# Patient Record
Sex: FEMALE | Race: WHITE | NOT HISPANIC OR LATINO | Employment: OTHER | URBAN - METROPOLITAN AREA
[De-identification: names, ages, dates, MRNs, and addresses within clinical notes are randomized per-mention and may not be internally consistent; named-entity substitution may affect disease eponyms.]

---

## 2017-01-04 ENCOUNTER — HOSPITAL ENCOUNTER (OUTPATIENT)
Dept: RADIOLOGY | Facility: MEDICAL CENTER | Age: 67
End: 2017-01-04
Attending: INTERNAL MEDICINE
Payer: COMMERCIAL

## 2017-01-04 DIAGNOSIS — I70.90 ATHEROSCLEROSIS OF ARTERIES: ICD-10-CM

## 2017-01-04 PROCEDURE — 4410556 CT-CARDIAC SCORING

## 2017-01-27 ENCOUNTER — OFFICE VISIT (OUTPATIENT)
Dept: MEDICAL GROUP | Facility: CLINIC | Age: 67
End: 2017-01-27
Payer: COMMERCIAL

## 2017-01-27 VITALS
RESPIRATION RATE: 18 BRPM | HEART RATE: 70 BPM | TEMPERATURE: 96.5 F | HEIGHT: 62 IN | OXYGEN SATURATION: 97 % | WEIGHT: 150 LBS | BODY MASS INDEX: 27.6 KG/M2 | DIASTOLIC BLOOD PRESSURE: 88 MMHG | SYSTOLIC BLOOD PRESSURE: 138 MMHG

## 2017-01-27 DIAGNOSIS — I10 ESSENTIAL HYPERTENSION: ICD-10-CM

## 2017-01-27 DIAGNOSIS — M54.42 LEFT-SIDED LOW BACK PAIN WITH LEFT-SIDED SCIATICA, UNSPECIFIED CHRONICITY: ICD-10-CM

## 2017-01-27 DIAGNOSIS — R23.4 SKIN TEXTURE CHANGES: ICD-10-CM

## 2017-01-27 DIAGNOSIS — Z13.820 SCREENING FOR OSTEOPOROSIS: ICD-10-CM

## 2017-01-27 PROCEDURE — 99214 OFFICE O/P EST MOD 30 MIN: CPT | Performed by: INTERNAL MEDICINE

## 2017-01-27 NOTE — MR AVS SNAPSHOT
"Delfina Gold   2017 9:00 AM   Office Visit   MRN: [id number]    Department:  [facility]   Dept Phone:  [phone]    Description:  Female : 1950   Provider:  Tacho Burciaga D.O.           Reason for Visit     Follow-Up           Allergies as of 2017     Allergen Noted Reactions    Clindamycin 2011       Patient with c. Difficile colitis due to this medication 7/3/2011      You were diagnosed with     Essential hypertension   [8203664]       Screening for osteoporosis   [175146]       Left-sided low back pain with left-sided sciatica, unspecified chronicity   [0483499]       Skin texture changes   [729544]         Vital Signs     Blood Pressure Pulse Temperature Respirations Height Weight    138/88 mmHg 70 35.8 °C (96.5 °F) 18 1.575 m (5' 2\") 68.04 kg (150 lb)    Body Mass Index Oxygen Saturation Smoking Status             27.43 kg/m2 97% Never Smoker          Basic Information     Date Of Birth Sex Race Ethnicity Preferred Language    1950 Female White Non- English      Problem List              ICD-10-CM Priority Class Noted - Resolved    Dyslipidemia E78.5   2010 - Present    Cellulitis L03.90   Unknown - Present    Hypertension I10   2013 - Present    TIA (transient ischemic attack) G45.9   2014 - Present    Right-sided low back pain with right-sided sciatica M54.41   2015 - Present    Left-sided low back pain with sciatica M54.42   2017 - Present      Health Maintenance        Date Due Completion Dates    IMM ZOSTER VACCINE 2011 ---    IMM INFLUENZA (1) 2016, 2014    BONE DENSITY 2016    MAMMOGRAM 2017, 10/26/2015, 10/20/2015, 2014, 2013, 2012, 2012, 2011, 2010, 2010, 2008, 2008, 2007, 2007, 2006, 2006, 2005    COLONOSCOPY 11/3/2020 11/3/2010 (Done)    Override on 11/3/2010: Done    IMM DTaP/Tdap/Td Vaccine " (2 - Td) 1/4/2023 1/4/2013            Current Immunizations     13-VALENT PCV PREVNAR 1/21/2014    Influenza TIV (IM) 9/29/2014, 1/21/2014    Pneumococcal polysaccharide vaccine (PPSV-23) 12/22/2016 10:27 AM    Tdap Vaccine 1/4/2013      Below and/or attached are the medications your provider expects you to take. Review all of your home medications and newly ordered medications with your provider and/or pharmacist. Follow medication instructions as directed by your provider and/or pharmacist. Please keep your medication list with you and share with your provider. Update the information when medications are discontinued, doses are changed, or new medications (including over-the-counter products) are added; and carry medication information at all times in the event of emergency situations     Allergies:  CLINDAMYCIN - (reactions not documented)               Medications  Valid as of: January 27, 2017 - 10:02 AM    Generic Name Brand Name Tablet Size Instructions for use    Aspirin (Tablet Delayed Response) ECOTRIN 81 MG Take 1 Tab by mouth every day.        Cholecalciferol (Tab) cholecalciferol 1000 UNIT Take 2,000 Units by mouth every day.        Estradiol (PATCH WEEKLY) CLIMARA 0.025 MG/24HR APPLY ONE PATCH TO THE SKIN AS DIRECTED AND REPLACE EVERY SATURDAY        Estradiol (PATCH WEEKLY) CLIMARA 0.025 MG/24HR Apply 1 Patch to skin as directed every 7 days.        Fiber   Take 1 Each by mouth every day.        Ibuprofen (Tab) MOTRIN 800 MG Take 800 mg by mouth every 8 hours as needed.        Lactobacillus Rhamnosus (GG) (Cap) CULTURELLE  Take 1 Cap by mouth every day.        Losartan Potassium (Tab) COZAAR 50 MG Take 1 Tab by mouth every day.        MethylPREDNISolone (Kit) MEDROL DOSEPACK 4 MG Per package insert.        NON SPECIFIED   Medrol dose pack take as directed.        Omeprazole (CAPSULE DELAYED RELEASE) PRILOSEC 20 MG Take 1 Cap by mouth 2 times a day.        TraMADol HCl (Tab) ULTRAM 50 MG Take 1 Tab by  mouth every four hours as needed for Mild Pain.        .                 Medicines prescribed today were sent to:     [id number] - [place], [place] - [address] AT [facility] [address]    [address] [place] [address]    Phone: [phone] Fax: [phone]    Open 24 Hours?: No      Medication refill instructions:       If your prescription bottle indicates you have medication refills left, it is not necessary to call your provider’s office. Please contact your pharmacy and they will refill your medication.    If your prescription bottle indicates you do not have any refills left, you may request refills at any time through one of the following ways: The online [employer] system (except Urgent Care), by calling your provider’s office, or by asking your pharmacy to contact your provider’s office with a refill request. Medication refills are processed only during regular business hours and may not be available until the next business day. Your provider may request additional information or to have a follow-up visit with you prior to refilling your medication.   *Please Note: Medication refills are assigned a new Rx number when refilled electronically. Your pharmacy may indicate that no refills were authorized even though a new prescription for the same medication is available at the pharmacy. Please request the medicine by name with the pharmacy before contacting your provider for a refill.        Your To Do List     Future Labs/Procedures Complete By Expires    DS-BONE DENSITY STUDY (DEXA)  As directed 7/30/2017      Referral     A referral request has been sent to our patient care coordination department. Please allow 3-5 business days for us to process this request and contact you either by phone or mail. If you do not hear from us by the 5th business day, please call us at [phone].           [employer] Access Code: Activation code not generated  Current [employer] Status:  Active

## 2017-01-27 NOTE — PROGRESS NOTES
CC: Delfina Gold is a 66 y.o. female is suffering from   Chief Complaint   Patient presents with   • Follow-Up         SUBJECTIVE:  1. Essential hypertension  Delfina is here for follow-up, has brought in blood pressure records that show significant improvement in her blood pressure. Patient is feeling well except for some dryness associated with use of losartan.     2. Screening for osteoporosis  Patient is in need of screening for osteoporosis orders written.     3. Left-sided low back pain with left-sided sciatica, unspecified chronicity  Patient with ongoing low back pain intermittently radiating the left hand side.     4. Skin texture changes  Patient was skin changes requesting evaluation by dermatology        Past social, family, history:   Social History   Substance Use Topics   • Smoking status: Never Smoker    • Smokeless tobacco: Never Used   • Alcohol Use: No         MEDICATIONS:    Current outpatient prescriptions:   •  ibuprofen (MOTRIN) 800 MG Tab, Take 800 mg by mouth every 8 hours as needed., Disp: , Rfl:   •  tramadol (ULTRAM) 50 MG Tab, Take 1 Tab by mouth every four hours as needed for Mild Pain., Disp: 90 Tab, Rfl: 3  •  losartan (COZAAR) 50 MG Tab, Take 1 Tab by mouth every day., Disp: 90 Tab, Rfl: 3  •  estradiol (CLIMARA) 0.025 MG/24HR PATCH WEEKLY, Apply 1 Patch to skin as directed every 7 days., Disp: , Rfl:   •  NON SPECIFIED, Medrol dose pack take as directed., Disp: 1 Each, Rfl: 0  •  estradiol (CLIMARA) 0.025 MG/24HR PATCH WEEKLY, APPLY ONE PATCH TO THE SKIN AS DIRECTED AND REPLACE EVERY SATURDAY, Disp: 12 Patch, Rfl: 3  •  methylPREDNISolone (MEDROL DOSEPACK) 4 MG tablet, Per package insert. (Patient not taking: Reported on 12/22/2016), Disp: 1 Kit, Rfl: 0  •  omeprazole (PRILOSEC) 20 MG delayed-release capsule, Take 1 Cap by mouth 2 times a day. (Patient not taking: Reported on 12/22/2016), Disp: 60 Cap, Rfl: 3  •  aspirin EC (ECOTRIN) 81 MG TBEC, Take 1 Tab by mouth every  "day. (Patient not taking: Reported on 12/22/2016), Disp: 30 Tab, Rfl: 0  •  vitamin D (CHOLECALCIFEROL) 1000 UNIT TABS, Take 2,000 Units by mouth every day., Disp: , Rfl:   •  lactobacillus rhamnosus, GG, (CULTURELLE) CAPS, Take 1 Cap by mouth every day., Disp: , Rfl:   •  FIBER SELECT GUMMIES PO, Take 1 Each by mouth every day., Disp: , Rfl:     PROBLEMS:  Patient Active Problem List    Diagnosis Date Noted   • Left-sided low back pain with sciatica 01/27/2017   • Right-sided low back pain with right-sided sciatica 09/24/2015   • TIA (transient ischemic attack) 01/21/2014   • Hypertension 01/04/2013   • Cellulitis    • Dyslipidemia 09/24/2010       REVIEW OF SYSTEMS:  Gen.:  No Nausea, Vomiting, fever, Chills.  Heart: No chest pain.  Lungs:  No shortness of Breath.  Psychological: Trip unusual Anxiety depression     PHYSICAL EXAM   Constitutional: Alert, cooperative, not in acute distress.  Cardiovascular:  Rate Rhythm is regular without murmurs rubs clicks.     Thorax & Lungs: Clear to auscultation, no wheezing, rhonchi, or rales  HENT: Normocephalic, Atraumatic.  Eyes: PERRLA, EOMI, Conjunctiva normal.   Neck: Trachia is midline no swelling of the thyroid.   Extremities: Atraumatic with symmetric distal pulses, No edema, No tenderness, No cyanosis, No clubbing.   Musculoskeletal: Patient will retain for flexion and extension side bending and rotation clinically stable regarding her back pain.   Neurologic: Alert & oriented x 3, cranial nerves II through XII are intact, Normal motor function, Normal sensory function, No focal deficits noted.   Psychiatric: Affect normal, Judgment normal, Mood normal.     VITAL SIGNS:/88 mmHg  Pulse 70  Temp(Src) 35.8 °C (96.5 °F)  Resp 18  Ht 1.575 m (5' 2\")  Wt 68.04 kg (150 lb)  BMI 27.43 kg/m2  SpO2 97%    Labs: Reviewed    Assessment:                                                     Plan:    1. Essential hypertension  Improved blood pressure off of " nonsteroidal anti-inflammatories.     2. Screening for osteoporosis  DEXA scan ordered  - DS-BONE DENSITY STUDY (DEXA); Future    3. Left-sided low back pain with left-sided sciatica, unspecified chronicity  No change in medical therapy clinically stable at this point    4. Skin texture changes  Referral written to dermatology  - REFERRAL TO DERMATOLOGY

## 2017-02-06 ENCOUNTER — HOSPITAL ENCOUNTER (OUTPATIENT)
Dept: RADIOLOGY | Facility: MEDICAL CENTER | Age: 67
End: 2017-02-06
Attending: INTERNAL MEDICINE
Payer: COMMERCIAL

## 2017-02-06 DIAGNOSIS — M85.80 OSTEOPENIA: ICD-10-CM

## 2017-02-06 PROCEDURE — 77080 DXA BONE DENSITY AXIAL: CPT

## 2017-04-19 ENCOUNTER — OFFICE VISIT (OUTPATIENT)
Dept: MEDICAL GROUP | Facility: CLINIC | Age: 67
End: 2017-04-19
Payer: COMMERCIAL

## 2017-04-19 VITALS
HEART RATE: 82 BPM | WEIGHT: 148.3 LBS | OXYGEN SATURATION: 94 % | BODY MASS INDEX: 26.28 KG/M2 | DIASTOLIC BLOOD PRESSURE: 82 MMHG | HEIGHT: 63 IN | RESPIRATION RATE: 18 BRPM | SYSTOLIC BLOOD PRESSURE: 126 MMHG | TEMPERATURE: 98.6 F

## 2017-04-19 DIAGNOSIS — M65.351 TRIGGER LITTLE FINGER OF RIGHT HAND: ICD-10-CM

## 2017-04-19 DIAGNOSIS — I10 ESSENTIAL HYPERTENSION: ICD-10-CM

## 2017-04-19 DIAGNOSIS — E78.5 DYSLIPIDEMIA: ICD-10-CM

## 2017-04-19 PROCEDURE — 99213 OFFICE O/P EST LOW 20 MIN: CPT | Performed by: INTERNAL MEDICINE

## 2017-04-19 RX ORDER — LOSARTAN POTASSIUM 25 MG/1
25 TABLET ORAL DAILY
Qty: 90 TAB | Refills: 3 | Status: SHIPPED | OUTPATIENT
Start: 2017-04-19 | End: 2018-01-17

## 2017-04-19 NOTE — PROGRESS NOTES
CC: Delfina Gold is a 66 y.o. female is suffering from   Chief Complaint   Patient presents with   • Follow-Up         SUBJECTIVE:  1. Essential hypertension  Patient with records which show good control at home.     2. Trigger little finger of right hand  After working in the yard in february.         Past social, family, history: .   Social History   Substance Use Topics   • Smoking status: Never Smoker    • Smokeless tobacco: Never Used   • Alcohol Use: 4.2 - 8.4 oz/week     7-14 Cans of beer per week      Comment: 1 to 2 beers a day         MEDICATIONS:    Current outpatient prescriptions:   •  losartan (COZAAR) 25 MG Tab, Take 1 Tab by mouth every day., Disp: 90 Tab, Rfl: 3  •  estradiol (CLIMARA) 0.025 MG/24HR PATCH WEEKLY, Apply 1 Patch to skin as directed every 7 days., Disp: , Rfl:   •  ibuprofen (MOTRIN) 800 MG Tab, Take 800 mg by mouth every 8 hours as needed., Disp: , Rfl:   •  tramadol (ULTRAM) 50 MG Tab, Take 1 Tab by mouth every four hours as needed for Mild Pain., Disp: 90 Tab, Rfl: 3  •  NON SPECIFIED, Medrol dose pack take as directed., Disp: 1 Each, Rfl: 0  •  estradiol (CLIMARA) 0.025 MG/24HR PATCH WEEKLY, APPLY ONE PATCH TO THE SKIN AS DIRECTED AND REPLACE EVERY SATURDAY, Disp: 12 Patch, Rfl: 3  •  methylPREDNISolone (MEDROL DOSEPACK) 4 MG tablet, Per package insert. (Patient not taking: Reported on 12/22/2016), Disp: 1 Kit, Rfl: 0  •  omeprazole (PRILOSEC) 20 MG delayed-release capsule, Take 1 Cap by mouth 2 times a day. (Patient not taking: Reported on 12/22/2016), Disp: 60 Cap, Rfl: 3  •  aspirin EC (ECOTRIN) 81 MG TBEC, Take 1 Tab by mouth every day. (Patient not taking: Reported on 12/22/2016), Disp: 30 Tab, Rfl: 0  •  vitamin D (CHOLECALCIFEROL) 1000 UNIT TABS, Take 2,000 Units by mouth every day., Disp: , Rfl:   •  lactobacillus rhamnosus, GG, (CULTURELLE) CAPS, Take 1 Cap by mouth every day., Disp: , Rfl:   •  FIBER SELECT GUMMIES PO, Take 1 Each by mouth every day., Disp:  ", Rfl:     PROBLEMS:  Patient Active Problem List    Diagnosis Date Noted   • Left-sided low back pain with sciatica 01/27/2017   • Right-sided low back pain with right-sided sciatica 09/24/2015   • TIA (transient ischemic attack) 01/21/2014   • Hypertension 01/04/2013   • Cellulitis    • Dyslipidemia 09/24/2010       REVIEW OF SYSTEMS:  Gen.:  No Nausea, Vomiting, fever, Chills.  Heart: No chest pain.  Lungs:  No shortness of Breath.  Psychological: Trip unusual Anxiety depression     PHYSICAL EXAM   Constitutional: Alert, cooperative, not in acute distress.  Cardiovascular:  Rate Rhythm is regular without murmurs rubs clicks.     Thorax & Lungs: Clear to auscultation, no wheezing, rhonchi, or rales  HENT: Normocephalic, Atraumatic.  Eyes: PERRLA, EOMI, Conjunctiva normal.   Neck: Trachia is midline no swelling of the thyroid.   Musculoskeletal:  Right hand little finger with trigger finger.   Neurologic: Alert & oriented x 3, cranial nerves II through XII are intact, Normal motor function, Normal sensory function, No focal deficits noted.   Psychiatric: Affect normal, Judgment normal, Mood normal.     VITAL SIGNS:/82 mmHg  Pulse 82  Temp(Src) 37 °C (98.6 °F)  Resp 18  Ht 1.6 m (5' 3\")  Wt 67.268 kg (148 lb 4.8 oz)  BMI 26.28 kg/m2  SpO2 94%  Breastfeeding? No    Labs: Reviewed    Assessment:                                                     Plan:    1. Essential hypertension  Continue with Cozaar.   - losartan (COZAAR) 25 MG Tab; Take 1 Tab by mouth every day.  Dispense: 90 Tab; Refill: 3    2. Trigger little finger of right hand  Stable   - REFERRAL TO ORTHOPEDICS          "

## 2017-04-19 NOTE — MR AVS SNAPSHOT
"Delfina Jaylinmax Gold   2017 2:20 PM   Office Visit   MRN: [id number]    Department:  [facility]   Dept Phone:  [phone]    Description:  Female : 1950   Provider:  Tacho Burciaga D.O.           Reason for Visit     Follow-Up           Allergies as of 2017     Allergen Noted Reactions    Clindamycin 2011       Patient with c. Difficile colitis due to this medication 7/3/2011      You were diagnosed with     Essential hypertension   [5813699]       Trigger little finger of right hand   [699824]       Dyslipidemia   [182141]         Vital Signs     Blood Pressure Pulse Temperature Respirations Height Weight    126/82 mmHg 82 37 °C (98.6 °F) 18 1.6 m (5' 3\") 67.268 kg (148 lb 4.8 oz)    Body Mass Index Oxygen Saturation Breastfeeding? Smoking Status          26.28 kg/m2 94% No Never Smoker         Basic Information     Date Of Birth Sex Race Ethnicity Preferred Language    1950 Female White Non- English      Problem List              ICD-10-CM Priority Class Noted - Resolved    Dyslipidemia E78.5   2010 - Present    Cellulitis L03.90   Unknown - Present    Hypertension I10   2013 - Present    TIA (transient ischemic attack) G45.9   2014 - Present    Right-sided low back pain with right-sided sciatica M54.41   2015 - Present    Left-sided low back pain with sciatica M54.42   2017 - Present      Health Maintenance        Date Due Completion Dates    IMM ZOSTER VACCINE 2011 ---    MAMMOGRAM 2017, 10/26/2015, 10/20/2015, 2014, 2013, 2012, 2012, 2011, 2010, 2010, 2008, 2008, 2007, 2007, 2006, 2006, 2005    COLONOSCOPY 11/3/2020 11/3/2010 (Done)    Override on 11/3/2010: Done    BONE DENSITY 2022, 2005    IMM DTaP/Tdap/Td Vaccine (2 - Td) 2023            Current Immunizations     13-VALENT PCV PREVNAR 2014    Influenza TIV " (IM) 9/29/2014, 1/21/2014    Pneumococcal polysaccharide vaccine (PPSV-23) 12/22/2016 10:27 AM    Tdap Vaccine 1/4/2013      Below and/or attached are the medications your provider expects you to take. Review all of your home medications and newly ordered medications with your provider and/or pharmacist. Follow medication instructions as directed by your provider and/or pharmacist. Please keep your medication list with you and share with your provider. Update the information when medications are discontinued, doses are changed, or new medications (including over-the-counter products) are added; and carry medication information at all times in the event of emergency situations     Allergies:  CLINDAMYCIN - (reactions not documented)               Medications  Valid as of: April 19, 2017 -  4:53 PM    Generic Name Brand Name Tablet Size Instructions for use    Aspirin (Tablet Delayed Response) ECOTRIN 81 MG Take 1 Tab by mouth every day.        Cholecalciferol (Tab) cholecalciferol 1000 UNIT Take 2,000 Units by mouth every day.        Estradiol (PATCH WEEKLY) CLIMARA 0.025 MG/24HR APPLY ONE PATCH TO THE SKIN AS DIRECTED AND REPLACE EVERY SATURDAY        Estradiol (PATCH WEEKLY) CLIMARA 0.025 MG/24HR Apply 1 Patch to skin as directed every 7 days.        Fiber   Take 1 Each by mouth every day.        Ibuprofen (Tab) MOTRIN 800 MG Take 800 mg by mouth every 8 hours as needed.        Lactobacillus Rhamnosus (GG) (Cap) CULTURELLE  Take 1 Cap by mouth every day.        Losartan Potassium (Tab) COZAAR 25 MG Take 1 Tab by mouth every day.        MethylPREDNISolone (Kit) MEDROL DOSEPACK 4 MG Per package insert.        NON SPECIFIED   Medrol dose pack take as directed.        Omeprazole (CAPSULE DELAYED RELEASE) PRILOSEC 20 MG Take 1 Cap by mouth 2 times a day.        TraMADol HCl (Tab) ULTRAM 50 MG Take 1 Tab by mouth every four hours as needed for Mild Pain.        .                 Medicines prescribed today were sent to:       [id number] - [place], [place] - [address] AT [facility] [address]    [address] [place] [address]    Phone: [phone] Fax: [phone]    Open 24 Hours?: No      Medication refill instructions:       If your prescription bottle indicates you have medication refills left, it is not necessary to call your provider’s office. Please contact your pharmacy and they will refill your medication.    If your prescription bottle indicates you do not have any refills left, you may request refills at any time through one of the following ways: The online [employer] system (except Urgent Care), by calling your provider’s office, or by asking your pharmacy to contact your provider’s office with a refill request. Medication refills are processed only during regular business hours and may not be available until the next business day. Your provider may request additional information or to have a follow-up visit with you prior to refilling your medication.   *Please Note: Medication refills are assigned a new Rx number when refilled electronically. Your pharmacy may indicate that no refills were authorized even though a new prescription for the same medication is available at the pharmacy. Please request the medicine by name with the pharmacy before contacting your provider for a refill.        Your To Do List     Future Labs/Procedures Complete By Expires    CBC WITH DIFFERENTIAL  As directed 4/20/2018    COMP METABOLIC PANEL  As directed 4/20/2018    LIPID PROFILE  As directed 4/20/2018      Referral     A referral request has been sent to our patient care coordination department. Please allow 3-5 business days for us to process this request and contact you either by phone or mail. If you do not hear from us by the 5th business day, please call us at [phone].           [employer] Access Code: Activation code not generated  Current [employer] Status: Active

## 2017-07-26 ENCOUNTER — RX ONLY (OUTPATIENT)
Age: 67
Setting detail: RX ONLY
End: 2017-07-26

## 2017-12-19 ENCOUNTER — HOSPITAL ENCOUNTER (OUTPATIENT)
Dept: LAB | Facility: MEDICAL CENTER | Age: 67
End: 2017-12-19
Attending: INTERNAL MEDICINE
Payer: COMMERCIAL

## 2017-12-19 DIAGNOSIS — E78.5 DYSLIPIDEMIA: ICD-10-CM

## 2017-12-19 LAB
ALBUMIN SERPL BCP-MCNC: 4.2 G/DL (ref 3.2–4.9)
ALBUMIN/GLOB SERPL: 1.8 G/DL
ALP SERPL-CCNC: 43 U/L (ref 30–99)
ALT SERPL-CCNC: 15 U/L (ref 2–50)
ANION GAP SERPL CALC-SCNC: 6 MMOL/L (ref 0–11.9)
AST SERPL-CCNC: 19 U/L (ref 12–45)
BASOPHILS # BLD AUTO: 1.6 % (ref 0–1.8)
BASOPHILS # BLD: 0.09 K/UL (ref 0–0.12)
BILIRUB SERPL-MCNC: 0.6 MG/DL (ref 0.1–1.5)
BUN SERPL-MCNC: 13 MG/DL (ref 8–22)
CALCIUM SERPL-MCNC: 9.3 MG/DL (ref 8.5–10.5)
CHLORIDE SERPL-SCNC: 104 MMOL/L (ref 96–112)
CHOLEST SERPL-MCNC: 212 MG/DL (ref 100–199)
CO2 SERPL-SCNC: 29 MMOL/L (ref 20–33)
CREAT SERPL-MCNC: 0.58 MG/DL (ref 0.5–1.4)
EOSINOPHIL # BLD AUTO: 0.09 K/UL (ref 0–0.51)
EOSINOPHIL NFR BLD: 1.6 % (ref 0–6.9)
ERYTHROCYTE [DISTWIDTH] IN BLOOD BY AUTOMATED COUNT: 42.9 FL (ref 35.9–50)
GFR SERPL CREATININE-BSD FRML MDRD: >60 ML/MIN/1.73 M 2
GLOBULIN SER CALC-MCNC: 2.4 G/DL (ref 1.9–3.5)
GLUCOSE SERPL-MCNC: 93 MG/DL (ref 65–99)
HCT VFR BLD AUTO: 45.3 % (ref 37–47)
HDLC SERPL-MCNC: 70 MG/DL
HGB BLD-MCNC: 15.1 G/DL (ref 12–16)
IMM GRANULOCYTES # BLD AUTO: 0.02 K/UL (ref 0–0.11)
IMM GRANULOCYTES NFR BLD AUTO: 0.4 % (ref 0–0.9)
LDLC SERPL CALC-MCNC: 122 MG/DL
LYMPHOCYTES # BLD AUTO: 1.8 K/UL (ref 1–4.8)
LYMPHOCYTES NFR BLD: 32.2 % (ref 22–41)
MCH RBC QN AUTO: 30.9 PG (ref 27–33)
MCHC RBC AUTO-ENTMCNC: 33.3 G/DL (ref 33.6–35)
MCV RBC AUTO: 92.6 FL (ref 81.4–97.8)
MONOCYTES # BLD AUTO: 0.57 K/UL (ref 0–0.85)
MONOCYTES NFR BLD AUTO: 10.2 % (ref 0–13.4)
NEUTROPHILS # BLD AUTO: 3.02 K/UL (ref 2–7.15)
NEUTROPHILS NFR BLD: 54 % (ref 44–72)
NRBC # BLD AUTO: 0 K/UL
NRBC BLD-RTO: 0 /100 WBC
PLATELET # BLD AUTO: 306 K/UL (ref 164–446)
PMV BLD AUTO: 11.7 FL (ref 9–12.9)
POTASSIUM SERPL-SCNC: 4.7 MMOL/L (ref 3.6–5.5)
PROT SERPL-MCNC: 6.6 G/DL (ref 6–8.2)
RBC # BLD AUTO: 4.89 M/UL (ref 4.2–5.4)
SODIUM SERPL-SCNC: 139 MMOL/L (ref 135–145)
TRIGL SERPL-MCNC: 99 MG/DL (ref 0–149)
WBC # BLD AUTO: 5.6 K/UL (ref 4.8–10.8)

## 2017-12-19 PROCEDURE — 80061 LIPID PANEL: CPT

## 2017-12-19 PROCEDURE — 36415 COLL VENOUS BLD VENIPUNCTURE: CPT

## 2017-12-19 PROCEDURE — 85025 COMPLETE CBC W/AUTO DIFF WBC: CPT

## 2017-12-19 PROCEDURE — 80053 COMPREHEN METABOLIC PANEL: CPT

## 2017-12-27 ENCOUNTER — HOSPITAL ENCOUNTER (OUTPATIENT)
Dept: RADIOLOGY | Facility: MEDICAL CENTER | Age: 67
End: 2017-12-27
Attending: INTERNAL MEDICINE
Payer: COMMERCIAL

## 2017-12-27 DIAGNOSIS — Z12.31 SCREENING MAMMOGRAM, ENCOUNTER FOR: ICD-10-CM

## 2017-12-27 PROCEDURE — G0202 SCR MAMMO BI INCL CAD: HCPCS

## 2018-01-17 ENCOUNTER — OFFICE VISIT (OUTPATIENT)
Dept: MEDICAL GROUP | Facility: CLINIC | Age: 68
End: 2018-01-17
Payer: COMMERCIAL

## 2018-01-17 VITALS
SYSTOLIC BLOOD PRESSURE: 132 MMHG | HEART RATE: 84 BPM | RESPIRATION RATE: 16 BRPM | OXYGEN SATURATION: 96 % | BODY MASS INDEX: 26.9 KG/M2 | WEIGHT: 151.8 LBS | DIASTOLIC BLOOD PRESSURE: 76 MMHG | TEMPERATURE: 97.9 F | HEIGHT: 63 IN

## 2018-01-17 DIAGNOSIS — I10 ESSENTIAL HYPERTENSION: ICD-10-CM

## 2018-01-17 DIAGNOSIS — E78.5 DYSLIPIDEMIA: ICD-10-CM

## 2018-01-17 PROCEDURE — 99213 OFFICE O/P EST LOW 20 MIN: CPT | Performed by: INTERNAL MEDICINE

## 2018-01-17 RX ORDER — LOSARTAN POTASSIUM 50 MG/1
50 TABLET ORAL DAILY
Qty: 30 TAB | Refills: 6 | Status: SHIPPED | OUTPATIENT
Start: 2018-01-17 | End: 2018-01-17

## 2018-01-17 RX ORDER — HYDROCHLOROTHIAZIDE 12.5 MG/1
12.5 TABLET ORAL DAILY
Qty: 30 TAB | Refills: 6 | Status: SHIPPED | OUTPATIENT
Start: 2018-01-17 | End: 2018-09-01 | Stop reason: SDUPTHER

## 2018-01-17 ASSESSMENT — PATIENT HEALTH QUESTIONNAIRE - PHQ9: CLINICAL INTERPRETATION OF PHQ2 SCORE: 0

## 2018-01-17 NOTE — PROGRESS NOTES
CC: Delfina Gold is a 67 y.o. female is suffering from No chief complaint on file.        SUBJECTIVE:  1. Essential hypertension  Patient's here for follow-up has essential hypertension, her home blood pressure records show typically blood pressures in the 140s in the afternoons, recommend that we are a little more aggressive with his    2. Dyslipidemia  Patient with mild dyslipidemia, patient with an excellent HDL total cholesterol ratio approximately 3-1 recommend no action        Past social, family, history: ,  retire in 1-1/2 years moving back to [facility]  Social History   Substance Use Topics   • Smoking status: Never Smoker   • Smokeless tobacco: Never Used   • Alcohol use 4.2 - 8.4 oz/week     7 - 14 Cans of beer per week      Comment: 1 to 2 beers a day         MEDICATIONS:    Current Outpatient Prescriptions:   •  hydrochlorothiazide (HYDRODIURIL) 12.5 MG tablet, Take 1 Tab by mouth every day., Disp: 30 Tab, Rfl: 6  •  lactobacillus rhamnosus, GG, (CULTURELLE) CAPS, Take 1 Cap by mouth every day., Disp: , Rfl:   •  FIBER SELECT GUMMIES PO, Take 1 Each by mouth every day., Disp: , Rfl:   •  ibuprofen (MOTRIN) 800 MG Tab, Take 800 mg by mouth every 8 hours as needed., Disp: , Rfl:   •  vitamin D (CHOLECALCIFEROL) 1000 UNIT TABS, Take 2,000 Units by mouth every day., Disp: , Rfl:     PROBLEMS:  Patient Active Problem List    Diagnosis Date Noted   • Left-sided low back pain with sciatica 01/27/2017   • Right-sided low back pain with right-sided sciatica 09/24/2015   • TIA (transient ischemic attack) 01/21/2014   • Hypertension 01/04/2013   • Cellulitis    • Dyslipidemia 09/24/2010       REVIEW OF SYSTEMS:  Gen.:  No Nausea, Vomiting, fever, Chills.  Heart: No chest pain.  Lungs:  No shortness of Breath.  Psychological: Trip unusual Anxiety depression     PHYSICAL EXAM   Constitutional: Alert, cooperative, not in acute distress.  Cardiovascular:  Rate Rhythm is regular without  "murmurs rubs clicks.     Thorax & Lungs: Clear to auscultation, no wheezing, rhonchi, or rales  HENT: Normocephalic, Atraumatic.  Eyes: PERRLA, EOMI, Conjunctiva normal.   Neck: Trachia is midline no swelling of the thyroid.   Lymphatic: No lymphadenopathy noted.   Neurologic: Alert & oriented x 3, cranial nerves II through XII are intact, Normal motor function, Normal sensory function, No focal deficits noted.   Psychiatric: Affect normal, Judgment normal, Mood normal.     VITAL SIGNS:/76   Pulse 84   Temp 36.6 °C (97.9 °F)   Resp 16   Ht 1.6 m (5' 3\")   Wt 68.9 kg (151 lb 12.8 oz)   SpO2 96%   Breastfeeding? No   BMI 26.89 kg/m²     Labs: Reviewed    Assessment:                                                     Plan:    1. Essential hypertension  Add hydrochlorothiazide to patient's Cozaar  - hydrochlorothiazide (HYDRODIURIL) 12.5 MG tablet; Take 1 Tab by mouth every day.  Dispense: 30 Tab; Refill: 6  - BASIC METABOLIC PANEL; Future    2. Dyslipidemia  No change in medical therapy        "

## 2018-07-10 DIAGNOSIS — I10 ESSENTIAL HYPERTENSION: ICD-10-CM

## 2018-07-10 RX ORDER — LOSARTAN POTASSIUM 25 MG/1
TABLET ORAL
Qty: 90 TAB | Refills: 3 | Status: SHIPPED | OUTPATIENT
Start: 2018-07-10

## 2018-09-01 DIAGNOSIS — I10 ESSENTIAL HYPERTENSION: ICD-10-CM

## 2018-09-03 NOTE — TELEPHONE ENCOUNTER
Was the patient seen in the last year in this department? Yes lov 1/17/18    Does patient have an active prescription for medications requested? No     Received Request Via: Pharmacy

## 2018-09-04 RX ORDER — HYDROCHLOROTHIAZIDE 12.5 MG/1
TABLET ORAL
Qty: 30 TAB | Refills: 11 | Status: SHIPPED | OUTPATIENT
Start: 2018-09-04

## 2018-09-06 ENCOUNTER — HOSPITAL ENCOUNTER (OUTPATIENT)
Dept: LAB | Facility: MEDICAL CENTER | Age: 68
End: 2018-09-06
Attending: INTERNAL MEDICINE
Payer: COMMERCIAL

## 2018-09-06 DIAGNOSIS — I10 ESSENTIAL HYPERTENSION: ICD-10-CM

## 2018-09-06 LAB
ANION GAP SERPL CALC-SCNC: 7 MMOL/L (ref 0–11.9)
BUN SERPL-MCNC: 11 MG/DL (ref 8–22)
CALCIUM SERPL-MCNC: 10 MG/DL (ref 8.5–10.5)
CHLORIDE SERPL-SCNC: 103 MMOL/L (ref 96–112)
CO2 SERPL-SCNC: 29 MMOL/L (ref 20–33)
CREAT SERPL-MCNC: 0.58 MG/DL (ref 0.5–1.4)
FASTING STATUS PATIENT QL REPORTED: NORMAL
GLUCOSE SERPL-MCNC: 94 MG/DL (ref 65–99)
POTASSIUM SERPL-SCNC: 4.1 MMOL/L (ref 3.6–5.5)
SODIUM SERPL-SCNC: 139 MMOL/L (ref 135–145)

## 2018-09-06 PROCEDURE — 36415 COLL VENOUS BLD VENIPUNCTURE: CPT

## 2018-09-06 PROCEDURE — 80048 BASIC METABOLIC PNL TOTAL CA: CPT

## 2018-09-18 ENCOUNTER — OFFICE VISIT (OUTPATIENT)
Dept: MEDICAL GROUP | Facility: LAB | Age: 68
End: 2018-09-18
Payer: COMMERCIAL

## 2018-09-18 VITALS
DIASTOLIC BLOOD PRESSURE: 90 MMHG | HEIGHT: 64 IN | RESPIRATION RATE: 12 BRPM | TEMPERATURE: 98.5 F | WEIGHT: 147 LBS | OXYGEN SATURATION: 95 % | HEART RATE: 93 BPM | SYSTOLIC BLOOD PRESSURE: 144 MMHG | BODY MASS INDEX: 25.1 KG/M2

## 2018-09-18 DIAGNOSIS — I10 ESSENTIAL HYPERTENSION: ICD-10-CM

## 2018-09-18 DIAGNOSIS — Z23 NEED FOR SHINGLES VACCINE: ICD-10-CM

## 2018-09-18 PROCEDURE — 99213 OFFICE O/P EST LOW 20 MIN: CPT | Performed by: INTERNAL MEDICINE

## 2018-09-19 NOTE — PROGRESS NOTES
CC: Delfina Gold is a 67 y.o. female is suffering from   Chief Complaint   Patient presents with   • Follow-Up     8 months         SUBJECTIVE:  1. Essential hypertension  Delfina is here for follow-up, continues to have essential hypertension also whitecoat hypertension is to stay on her current medical therapy.  Patient's blood pressures at home are well controlled    2. Need for shingles vaccine  Prescription written for  Shingrix        Past social, family, history:   Social History   Substance Use Topics   • Smoking status: Never Smoker   • Smokeless tobacco: Never Used   • Alcohol use 4.2 - 8.4 oz/week     7 - 14 Cans of beer per week      Comment: 1 to 2 beers a day         MEDICATIONS:    Current Outpatient Prescriptions:   •  NON SPECIFIED, Please vaccinate with Shingrix vaccine, Disp: 1 Each, Rfl: 1  •  hydroCHLOROthiazide (HYDRODIURIL) 12.5 MG tablet, TAKE 1 TABLET BY MOUTH EVERY DAY, Disp: 30 Tab, Rfl: 11  •  losartan (COZAAR) 25 MG Tab, TAKE 1 TABLET BY MOUTH EVERY DAY, Disp: 90 Tab, Rfl: 3  •  vitamin D (CHOLECALCIFEROL) 1000 UNIT TABS, Take 2,000 Units by mouth every day., Disp: , Rfl:   •  lactobacillus rhamnosus, GG, (CULTURELLE) CAPS, Take 1 Cap by mouth every day., Disp: , Rfl:   •  FIBER SELECT GUMMIES PO, Take 1 Each by mouth every day., Disp: , Rfl:   •  ibuprofen (MOTRIN) 800 MG Tab, Take 800 mg by mouth every 8 hours as needed., Disp: , Rfl:     PROBLEMS:  Patient Active Problem List    Diagnosis Date Noted   • Left-sided low back pain with sciatica 01/27/2017   • Right-sided low back pain with right-sided sciatica 09/24/2015   • TIA (transient ischemic attack) 01/21/2014   • White coat syndrome with hypertension 01/04/2013   • Cellulitis    • Dyslipidemia 09/24/2010       REVIEW OF SYSTEMS:  Gen.:  No Nausea, Vomiting, fever, Chills.  Heart: No chest pain.  Lungs:  No shortness of Breath.  Psychological: Trip unusual Anxiety depression     PHYSICAL EXAM   Constitutional: Alert,  "cooperative, not in acute distress.  Cardiovascular:  Rate Rhythm is regular without murmurs rubs clicks.     Thorax & Lungs: Clear to auscultation, no wheezing, rhonchi, or rales  HENT: Normocephalic, Atraumatic.  Eyes: PERRLA, EOMI, Conjunctiva normal.   Neck: Trachia is midline no swelling of the thyroid.   Lymphatic: No lymphadenopathy noted.   Neurologic: Alert & oriented x 3, cranial nerves II through XII are intact, Normal motor function, Normal sensory function, No focal deficits noted.   Psychiatric: Affect normal, Judgment normal, Mood normal.     VITAL SIGNS:/90   Pulse 93   Temp 36.9 °C (98.5 °F)   Resp 12   Ht 1.626 m (5' 4\")   Wt 66.7 kg (147 lb)   SpO2 95%   BMI 25.23 kg/m²     Labs: Reviewed    Assessment:                                                     Plan:    1. Essential hypertension  Controlled patient with whitecoat hypertension    2. Need for shingles vaccine  Prescription written  - NON SPECIFIED; Please vaccinate with Shingrix vaccine  Dispense: 1 Each; Refill: 1    Please note patient is moving to [facility] will be transferring care to that area in December.    "

## 2018-09-28 ENCOUNTER — PATIENT MESSAGE (OUTPATIENT)
Dept: MEDICAL GROUP | Facility: LAB | Age: 68
End: 2018-09-28

## 2018-09-28 ENCOUNTER — TELEPHONE (OUTPATIENT)
Dept: MEDICAL GROUP | Facility: LAB | Age: 68
End: 2018-09-28

## 2018-09-28 ENCOUNTER — PATIENT MESSAGE (OUTPATIENT)
Dept: FAMILY PLANNING/WOMEN'S HEALTH CLINIC | Facility: OTHER | Age: 68
End: 2018-09-28

## 2018-09-28 DIAGNOSIS — Z12.83 SKIN CANCER SCREENING: ICD-10-CM

## 2018-09-28 DIAGNOSIS — R23.8 OTHER SKIN CHANGES: ICD-10-CM

## 2018-09-28 NOTE — TELEPHONE ENCOUNTER
Delfina Gold  [address] [place] [address] [facility]   Phone Number: [phone]             I am due for annual dermatology cancer screen - do I need a referral from you to do that?  Last year I went to [facility].  Thanks - have a great weekend,     Delfina Gold        Please sign referral pending in orders

## 2018-10-31 ENCOUNTER — APPOINTMENT (RX ONLY)
Dept: URBAN - METROPOLITAN AREA CLINIC 4 | Facility: CLINIC | Age: 68
Setting detail: DERMATOLOGY
End: 2018-10-31

## 2018-10-31 DIAGNOSIS — Z71.89 OTHER SPECIFIED COUNSELING: ICD-10-CM

## 2018-10-31 DIAGNOSIS — L81.4 OTHER MELANIN HYPERPIGMENTATION: ICD-10-CM

## 2018-10-31 DIAGNOSIS — L909 UNSPECIFIED HYPERTROPHIC AND ATROPHIC CONDITIONS OF SKIN: ICD-10-CM

## 2018-10-31 DIAGNOSIS — L919 UNSPECIFIED HYPERTROPHIC AND ATROPHIC CONDITIONS OF SKIN: ICD-10-CM

## 2018-10-31 DIAGNOSIS — D18.0 HEMANGIOMA: ICD-10-CM

## 2018-10-31 DIAGNOSIS — D22 MELANOCYTIC NEVI: ICD-10-CM

## 2018-10-31 DIAGNOSIS — L57.0 ACTINIC KERATOSIS: ICD-10-CM

## 2018-10-31 DIAGNOSIS — L987 UNSPECIFIED HYPERTROPHIC AND ATROPHIC CONDITIONS OF SKIN: ICD-10-CM

## 2018-10-31 PROBLEM — D23.62 OTHER BENIGN NEOPLASM OF SKIN OF LEFT UPPER LIMB, INCLUDING SHOULDER: Status: ACTIVE | Noted: 2018-10-31

## 2018-10-31 PROBLEM — D18.01 HEMANGIOMA OF SKIN AND SUBCUTANEOUS TISSUE: Status: ACTIVE | Noted: 2018-10-31

## 2018-10-31 PROBLEM — D23.61 OTHER BENIGN NEOPLASM OF SKIN OF RIGHT UPPER LIMB, INCLUDING SHOULDER: Status: ACTIVE | Noted: 2018-10-31

## 2018-10-31 PROBLEM — I10 ESSENTIAL (PRIMARY) HYPERTENSION: Status: ACTIVE | Noted: 2018-10-31

## 2018-10-31 PROBLEM — D22.5 MELANOCYTIC NEVI OF TRUNK: Status: ACTIVE | Noted: 2018-10-31

## 2018-10-31 PROCEDURE — ? COUNSELING

## 2018-10-31 PROCEDURE — ? OBSERVATION AND MEASURE

## 2018-10-31 PROCEDURE — 99213 OFFICE O/P EST LOW 20 MIN: CPT | Mod: 25

## 2018-10-31 PROCEDURE — ? LIQUID NITROGEN

## 2018-10-31 ASSESSMENT — LOCATION DETAILED DESCRIPTION DERM
LOCATION DETAILED: RIGHT INFERIOR CENTRAL BUCCAL CHEEK
LOCATION DETAILED: LEFT RIB CAGE
LOCATION DETAILED: EPIGASTRIC SKIN
LOCATION DETAILED: LEFT POSTERIOR SHOULDER
LOCATION DETAILED: RIGHT MEDIAL UPPER BACK

## 2018-10-31 ASSESSMENT — LOCATION ZONE DERM
LOCATION ZONE: ARM
LOCATION ZONE: FACE
LOCATION ZONE: TRUNK

## 2018-10-31 ASSESSMENT — LOCATION SIMPLE DESCRIPTION DERM
LOCATION SIMPLE: RIGHT CHEEK
LOCATION SIMPLE: RIGHT UPPER BACK
LOCATION SIMPLE: ABDOMEN
LOCATION SIMPLE: LEFT SHOULDER

## 2018-10-31 NOTE — HPI: FULL BODY SKIN EXAMINATION
What Is The Reason For Today's Visit?: Full Body Skin Examination
What Is The Reason For Today's Visit? (Being Monitored For X): concerning skin lesions on an annual basis
Additional History: She denies any changes in her moles. She also denies any tender or bleeding spots.\\nShe is moving to [place] in December

## 2021-03-03 DIAGNOSIS — Z23 NEED FOR VACCINATION: ICD-10-CM
